# Patient Record
Sex: MALE | Race: OTHER | NOT HISPANIC OR LATINO | URBAN - METROPOLITAN AREA
[De-identification: names, ages, dates, MRNs, and addresses within clinical notes are randomized per-mention and may not be internally consistent; named-entity substitution may affect disease eponyms.]

---

## 2022-04-19 ENCOUNTER — EMERGENCY (EMERGENCY)
Facility: HOSPITAL | Age: 22
LOS: 1 days | Discharge: HOME | End: 2022-04-19
Admitting: EMERGENCY MEDICINE
Payer: COMMERCIAL

## 2022-04-19 VITALS
HEART RATE: 69 BPM | DIASTOLIC BLOOD PRESSURE: 86 MMHG | SYSTOLIC BLOOD PRESSURE: 125 MMHG | OXYGEN SATURATION: 98 % | RESPIRATION RATE: 18 BRPM | TEMPERATURE: 98 F | WEIGHT: 190.04 LBS
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
## 2022-04-19 DIAGNOSIS — Z91.018 ALLERGY TO OTHER FOODS: ICD-10-CM

## 2022-04-19 DIAGNOSIS — S62.306A UNSPECIFIED FRACTURE OF FIFTH METACARPAL BONE, RIGHT HAND, INITIAL ENCOUNTER FOR CLOSED FRACTURE: ICD-10-CM
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
## 2022-04-19 PROCEDURE — 73140 X-RAY EXAM OF FINGER(S): CPT | Mod: 26,RT

## 2022-04-19 PROCEDURE — 73140 X-RAY EXAM OF FINGER(S): CPT

## 2022-04-19 PROCEDURE — 99283 EMERGENCY DEPT VISIT LOW MDM: CPT

## 2022-04-19 PROCEDURE — 99283 EMERGENCY DEPT VISIT LOW MDM: CPT | Mod: 25

## 2022-04-19 NOTE — ED PROVIDER NOTE - OBJECTIVE STATEMENT
20 y/o m presents c/o right 5th finger fracture yesterday while playing with a ball.  Pt stating he went to urgent care, had xrays which he has with him and told to f/u with hand surgery, put in finger splint.  Pt stating UC didn't arrange follow up for him yet so he decided to come to ED.  Denies numbness/tingling to ext, all other ROS negative.

## 2022-04-19 NOTE — ED ADULT NURSE NOTE - NS ED NURSE RECORD ANOTHER HT AND WT
Detail Level: Detailed Additional Notes: Pt went through 3 [facility] from 9/6/2018 to 8/12/19. Pt is self administering Taltz.  Last injection was done in the beginning of the month (December) Yes

## 2022-04-19 NOTE — ED PROVIDER NOTE - CLINICAL SUMMARY MEDICAL DECISION MAKING FREE TEXT BOX
22 y/o m presents with right 5th finger fracture; ext NVI, in splint currently, xray from  reviewed.  No indication for emergent surgical intervention, needs outpatient f/u, referral coordinator in ED to facilitate f/u, stable for d/c.

## 2022-04-19 NOTE — ED PROVIDER NOTE - PATIENT PORTAL LINK FT
You can access the FollowMyHealth Patient Portal offered by [facility] by registering at the following website: [email or web address]. By joining [facility]’s FollowMyHealth portal, you will also be able to view your health information using other applications (apps) compatible with our system.

## 2022-04-20 PROBLEM — Z00.00 ENCOUNTER FOR PREVENTIVE HEALTH EXAMINATION: Status: ACTIVE | Noted: 2022-04-20

## 2022-04-21 ENCOUNTER — APPOINTMENT (OUTPATIENT)
Dept: ORTHOPEDIC SURGERY | Facility: CLINIC | Age: 22
End: 2022-04-21
Payer: COMMERCIAL

## 2022-04-21 VITALS
TEMPERATURE: 97.8 F | HEART RATE: 60 BPM | SYSTOLIC BLOOD PRESSURE: 117 MMHG | DIASTOLIC BLOOD PRESSURE: 80 MMHG | BODY MASS INDEX: 27.2 KG/M2 | OXYGEN SATURATION: 99 % | HEIGHT: 70 IN | WEIGHT: 190 LBS

## 2022-04-21 DIAGNOSIS — Z78.9 OTHER SPECIFIED HEALTH STATUS: ICD-10-CM

## 2022-04-21 PROCEDURE — 99203 OFFICE O/P NEW LOW 30 MIN: CPT

## 2022-04-21 NOTE — DISCUSSION/SUMMARY
[de-identified] : 21M with R hand 5th digit proximal phalanx fracture with rotational malalignment after playing flag football\par -Will schedule for surgery next week, CRPP vs ORIF of R finger proximal phalanx\par -No medical clearance required given age and risk factors\par -Blood work/Labs\par -NPO midnight before surgery

## 2022-04-21 NOTE — PHYSICAL EXAM
[de-identified] : R hand\par 5th digit swollen\par Firing flexor/extensor tendons, limited secondary to swelling\par Rotational malalignment of 5th digit with splaying of finger\par SILT rad/med digital nerves\par brisk cap refill  [de-identified] : Spiral fracture of 5th digit proximal phalanx

## 2022-04-21 NOTE — END OF VISIT
[] : Resident [FreeTextEntry3] : Yg Christine is a 21 year old RHD male who presents with a right hand closed small finger proximal phalanx fracture sustained on 4/18/22. We discussed treatment options including surgery and its alternatives.  Surgery is a right hand small finger proximal phalanx closed reduction and percutaneous pinning versus open reduction and internal fixation.  We discussed the risks and benefits of surgery. Risks include anesthesia, blood loss, need for blood transfusion, clots, stroke, myocardial infarct, chronic pain, loss of function, need for reoperation, nonunion, malunion, hardware failure, stiffness, damage to neurovascular structures, failure of fixation, need for reoperation, infection and covid-19.  The patient understands the risks.  He asked several great questions all of which were answered.  He will proceed with surgical intervention.

## 2022-04-21 NOTE — HISTORY OF PRESENT ILLNESS
[de-identified] : 21M playing flag football on Monday 4/18 who caught a football in his R hand and sustained a proximal phalanx fracture of 5th digit diagnosed at [facility] [place], [place]. He has been wearing an alumifoam splint. 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

## 2022-04-26 ENCOUNTER — TRANSCRIPTION ENCOUNTER (OUTPATIENT)
Age: 22
End: 2022-04-26

## 2022-04-26 NOTE — ASU PATIENT PROFILE, ADULT - FALL HARM RISK - UNIVERSAL INTERVENTIONS
Bed in lowest position, wheels locked, appropriate side rails in place/Call bell, personal items and telephone in reach/Instruct patient to call for assistance before getting out of bed or chair/Non-slip footwear when patient is out of bed/[place] to call system/Physically safe environment - no spills, clutter or unnecessary equipment/Purposeful Proactive Rounding/Room/bathroom lighting operational, light cord in reach

## 2022-04-27 ENCOUNTER — TRANSCRIPTION ENCOUNTER (OUTPATIENT)
Age: 22
End: 2022-04-27

## 2022-04-27 ENCOUNTER — APPOINTMENT (OUTPATIENT)
Dept: ORTHOPEDIC SURGERY | Facility: AMBULATORY SURGERY CENTER | Age: 22
End: 2022-04-27

## 2022-04-27 ENCOUNTER — OUTPATIENT (OUTPATIENT)
Dept: OUTPATIENT SERVICES | Facility: HOSPITAL | Age: 22
LOS: 1 days | Discharge: HOME | End: 2022-04-27
Payer: COMMERCIAL

## 2022-04-27 VITALS
HEIGHT: 70 IN | HEART RATE: 68 BPM | RESPIRATION RATE: 16 BRPM | SYSTOLIC BLOOD PRESSURE: 121 MMHG | OXYGEN SATURATION: 98 % | TEMPERATURE: 98 F | WEIGHT: 186.73 LBS | DIASTOLIC BLOOD PRESSURE: 68 MMHG

## 2022-04-27 VITALS
DIASTOLIC BLOOD PRESSURE: 72 MMHG | TEMPERATURE: 98 F | SYSTOLIC BLOOD PRESSURE: 128 MMHG | RESPIRATION RATE: 16 BRPM | HEART RATE: 88 BPM | OXYGEN SATURATION: 100 %

## 2022-04-27 PROCEDURE — 26735 TREAT FINGER FRACTURE EACH: CPT | Mod: F9

## 2022-04-27 DEVICE — IMPLANTABLE DEVICE: Type: IMPLANTABLE DEVICE | Site: RIGHT | Status: FUNCTIONAL

## 2022-04-27 DEVICE — K-WIRE BRASSELER (EXTRA SHARP) DOUBLE DIAMOND 0.9MM X 4": Type: IMPLANTABLE DEVICE | Site: RIGHT | Status: FUNCTIONAL

## 2022-04-27 RX ORDER — OXYCODONE HYDROCHLORIDE 5 MG/1
1 TABLET ORAL
Qty: 10 | Refills: 0
Start: 2022-04-27

## 2022-04-27 RX ORDER — OXYCODONE HYDROCHLORIDE 5 MG/1
5 TABLET ORAL ONCE
Refills: 0 | Status: DISCONTINUED | OUTPATIENT
Start: 2022-04-27 | End: 2022-04-27

## 2022-04-27 RX ORDER — ACETAMINOPHEN 500 MG
650 TABLET ORAL EVERY 6 HOURS
Refills: 0 | Status: DISCONTINUED | OUTPATIENT
Start: 2022-04-27 | End: 2022-04-27

## 2022-04-27 RX ORDER — CHLORHEXIDINE GLUCONATE 213 G/1000ML
1 SOLUTION TOPICAL DAILY
Refills: 0 | Status: DISCONTINUED | OUTPATIENT
Start: 2022-04-27 | End: 2022-04-27

## 2022-04-27 RX ORDER — SODIUM CHLORIDE 9 MG/ML
1000 INJECTION, SOLUTION INTRAVENOUS
Refills: 0 | Status: DISCONTINUED | OUTPATIENT
Start: 2022-04-27 | End: 2022-04-27

## 2022-04-27 RX ORDER — ONDANSETRON 8 MG/1
4 TABLET, FILM COATED ORAL ONCE
Refills: 0 | Status: DISCONTINUED | OUTPATIENT
Start: 2022-04-27 | End: 2022-04-27

## 2022-04-27 RX ADMIN — CHLORHEXIDINE GLUCONATE 1 APPLICATION(S): 213 SOLUTION TOPICAL at 15:12

## 2022-04-27 NOTE — BRIEF OPERATIVE NOTE - NSICDXBRIEFPROCEDURE_GEN_ALL_CORE_FT
PROCEDURES:  Closed reduction, finger, with pinning if indicated 27-Apr-2022 16:36:55  Adonis Cool

## 2022-04-27 NOTE — ASU DISCHARGE PLAN (ADULT/PEDIATRIC) - NS MD DC FALL RISK RISK
For information on Fall & Injury Prevention, visit: [email or web address] OR  [email or web address] OR  https://www.cdc.gov/steadi/patient.html

## 2022-04-27 NOTE — ASU DISCHARGE PLAN (ADULT/PEDIATRIC) - CARE PROVIDER_API CALL
Dave Moura)  [facility]  [address] [place] [address], [address] [place] [address]  Phone: [phone]  Fax: [phone]  Follow Up Time:

## 2022-04-28 PROBLEM — K21.9 GASTRO-ESOPHAGEAL REFLUX DISEASE WITHOUT ESOPHAGITIS: Chronic | Status: ACTIVE | Noted: 2022-04-27

## 2022-04-28 LAB
ANION GAP SERPL CALC-SCNC: 12 MMOL/L
APTT BLD: 34.4 SEC
BASOPHILS # BLD AUTO: 0.04 K/UL
BASOPHILS NFR BLD AUTO: 0.8 %
BUN SERPL-MCNC: 9 MG/DL
CALCIUM SERPL-MCNC: 9.4 MG/DL
CHLORIDE SERPL-SCNC: 103 MMOL/L
CO2 SERPL-SCNC: 26 MMOL/L
CREAT SERPL-MCNC: 1.13 MG/DL
EGFR: 94 ML/MIN/1.73M2
EOSINOPHIL # BLD AUTO: 0.09 K/UL
EOSINOPHIL NFR BLD AUTO: 1.8 %
GLUCOSE SERPL-MCNC: 82 MG/DL
HCT VFR BLD CALC: 45.5 %
HGB BLD-MCNC: 15 G/DL
IMM GRANULOCYTES NFR BLD AUTO: 0.6 %
INR PPP: 1.11 RATIO
LYMPHOCYTES # BLD AUTO: 1.52 K/UL
LYMPHOCYTES NFR BLD AUTO: 31.2 %
MAN DIFF?: NORMAL
MCHC RBC-ENTMCNC: 31.4 PG
MCHC RBC-ENTMCNC: 33 GM/DL
MCV RBC AUTO: 95.4 FL
MONOCYTES # BLD AUTO: 0.48 K/UL
MONOCYTES NFR BLD AUTO: 9.9 %
NEUTROPHILS # BLD AUTO: 2.71 K/UL
NEUTROPHILS NFR BLD AUTO: 55.7 %
PLATELET # BLD AUTO: 265 K/UL
POTASSIUM SERPL-SCNC: 4.4 MMOL/L
PT BLD: 13 SEC
RBC # BLD: 4.77 M/UL
RBC # FLD: 11.9 %
SODIUM SERPL-SCNC: 141 MMOL/L
WBC # FLD AUTO: 4.87 K/UL

## 2022-04-29 ENCOUNTER — APPOINTMENT (OUTPATIENT)
Dept: ORTHOPEDIC SURGERY | Facility: CLINIC | Age: 22
End: 2022-04-29
Payer: COMMERCIAL

## 2022-04-29 DIAGNOSIS — S62.91XA UNSPECIFIED FRACTURE OF RIGHT WRIST AND HAND, INITIAL ENCOUNTER FOR CLOSED FRACTURE: ICD-10-CM

## 2022-04-29 PROCEDURE — 99024 POSTOP FOLLOW-UP VISIT: CPT

## 2022-04-29 PROCEDURE — 73130 X-RAY EXAM OF HAND: CPT | Mod: RT

## 2022-04-29 RX ORDER — DOXYCYCLINE HYCLATE 100 MG/1
100 CAPSULE ORAL
Qty: 14 | Refills: 0 | Status: ACTIVE | COMMUNITY
Start: 2021-11-08

## 2022-04-29 RX ORDER — OXYCODONE 5 MG/1
5 TABLET ORAL
Qty: 28 | Refills: 0 | Status: ACTIVE | COMMUNITY
Start: 2022-04-29 | End: 1900-01-01

## 2022-05-01 PROBLEM — S62.91XA RIGHT HAND FRACTURE: Status: ACTIVE | Noted: 2022-04-21

## 2022-05-01 NOTE — HISTORY OF PRESENT ILLNESS
[Chills] : no chills [Constipation] : no constipation [Diarrhea] : no diarrhea [Dysuria] : no dysuria [Fever] : no fever [Nausea] : no nausea [Vomiting] : no vomiting [de-identified] : s/p Right hand small finger proximal phalanx ORIF  [de-identified] : 21 year old male 2 days s/p Right hand small finger proximal phalanx ORIF.  Patient started having pain in his finger after the anesthetic block wore off.  Since the pain started it has improved and is controlled by PO medications.  [de-identified] : RUE:\par splint and dressing c/d/i\par splint adjusted\par WWP distally\par SILT m/r/u and at distal aspect of small finger\par +motor EPL/FPL/EDC/FDP/IO [de-identified] : Right hand 3 views obtained in the office today shows s/p ORIF of right small finger proximal phalanx with hardware in appropriate position and alignment [de-identified] : 21 year old male 2 days s/p Right hand small finger proximal phalanx ORIF. [de-identified] : Patient noted increased pain in his right small finger after anesthetic block wore off.  His pain has improved and is controlled with oral pain medications.  He is neurovascular intact.  He will followup with Dr. Stone on Tuesday for splint removal.  He has a referral for OT and will start OT after his appointment with Dr. Stone.  We discussed red flag symptoms that would require emergent evaluation. He knows to call with any questions or concerns or if his symptoms acutely worsen.

## 2022-05-03 ENCOUNTER — APPOINTMENT (OUTPATIENT)
Dept: ORTHOPEDIC SURGERY | Facility: CLINIC | Age: 22
End: 2022-05-03
Payer: COMMERCIAL

## 2022-05-03 DIAGNOSIS — S62.616A DISPLACED FRACTURE OF PROXIMAL PHALANX OF RIGHT LITTLE FINGER, INITIAL ENCOUNTER FOR CLOSED FRACTURE: ICD-10-CM

## 2022-05-03 PROCEDURE — 99024 POSTOP FOLLOW-UP VISIT: CPT

## 2022-05-03 PROCEDURE — 73140 X-RAY EXAM OF FINGER(S): CPT | Mod: F9

## 2022-05-12 ENCOUNTER — APPOINTMENT (OUTPATIENT)
Dept: ORTHOPEDIC SURGERY | Facility: CLINIC | Age: 22
End: 2022-05-12
Payer: COMMERCIAL

## 2022-05-12 VITALS
HEART RATE: 72 BPM | DIASTOLIC BLOOD PRESSURE: 71 MMHG | OXYGEN SATURATION: 96 % | BODY MASS INDEX: 27.2 KG/M2 | SYSTOLIC BLOOD PRESSURE: 107 MMHG | WEIGHT: 190 LBS | HEIGHT: 70 IN

## 2022-05-12 PROCEDURE — 99024 POSTOP FOLLOW-UP VISIT: CPT

## 2024-12-30 NOTE — ASU PATIENT PROFILE, ADULT - ANESTHESIA, PREVIOUS REACTION, PROFILE
none Placed ultrasound IV on patient.  Discussed patient's symptoms with him.  Patient states he has had a headache, and a cough, without any other symptoms.  He denies any chest pain, nausea, vomiting, diarrhea, abdominal pain (abdomen soft, nondistended, non-tender on exam).  Patient states he has had no sick contacts as he is in an isolated area of his living facility.  Patient's x-ray shows diffuse interstitial haziness, likely due to pulmonary venous congestion.  Patient states he has been feeling febrile since today.  Acetaminophen administered for patient's fever of 102.9. - MD Agnes PGY1 Patient signed out to me by Dr. Alarcon. On reassessment, patient still febrile and states he feels horrible. Patient to be admitted for sepsis. -Anisa Jesus DO (PGY-1)

## (undated) DEVICE — TOURNIQUET CUFF 34" DUAL PORT W PLC

## (undated) DEVICE — Device

## (undated) DEVICE — DRAPE C ARM MINI

## (undated) DEVICE — SUT VICRYL 2-0 27" CT-1 UNDYED

## (undated) DEVICE — SAW BLADE STRYKER PRECISION 9X0.51X31MM

## (undated) DEVICE — SUT VICRYL 0 36" CT-1 UNDYED

## (undated) DEVICE — DRSG XEROFORM 1 X 8"

## (undated) DEVICE — PACK ORTHO ELBOW HAND

## (undated) DEVICE — STAPLER SKIN PROXIMATE